# Patient Record
Sex: FEMALE | NOT HISPANIC OR LATINO | URBAN - METROPOLITAN AREA
[De-identification: names, ages, dates, MRNs, and addresses within clinical notes are randomized per-mention and may not be internally consistent; named-entity substitution may affect disease eponyms.]

---

## 2017-02-11 ENCOUNTER — IMPORTED ENCOUNTER (OUTPATIENT)
Dept: URBAN - METROPOLITAN AREA CLINIC 1 | Facility: CLINIC | Age: 37
End: 2017-02-11

## 2017-02-11 PROBLEM — H52.13: Noted: 2017-02-11

## 2017-02-11 PROCEDURE — S0620 ROUTINE OPHTHALMOLOGICAL EXA: HCPCS

## 2017-02-11 NOTE — PATIENT DISCUSSION
1. Myopia: Rx was given for correction if indicated and requested. 2. Dry Eyes OU 3. H/o LASIK OU  Recommend Daily CTLs Return for an appointment in 1 week for Contact lens check. with Dr. Bailey Spencer.

## 2018-05-14 ENCOUNTER — IMPORTED ENCOUNTER (OUTPATIENT)
Dept: URBAN - METROPOLITAN AREA CLINIC 1 | Facility: CLINIC | Age: 38
End: 2018-05-14

## 2018-05-14 PROBLEM — H52.13: Noted: 2018-05-14

## 2018-05-14 PROCEDURE — S0621 ROUTINE OPHTHALMOLOGICAL EXA: HCPCS

## 2018-05-14 NOTE — PATIENT DISCUSSION
1. Myopia: Rx was given for correction if indicated and requested. 2.  2.  Dry Eyes OU 3. H/o LASIK OU  4. Return for an appointment in 1 year for 39 and Contact lens check. with Dr. Grady Espana.

## 2018-09-21 ENCOUNTER — IMPORTED ENCOUNTER (OUTPATIENT)
Dept: URBAN - METROPOLITAN AREA CLINIC 1 | Facility: CLINIC | Age: 38
End: 2018-09-21

## 2018-09-21 PROBLEM — S05.02XA: Noted: 2018-09-21

## 2018-09-21 PROBLEM — H04.123: Noted: 2018-09-21

## 2018-09-21 PROCEDURE — 92012 INTRM OPH EXAM EST PATIENT: CPT

## 2018-09-21 NOTE — PATIENT DISCUSSION
1.  Corneal Abrasion OS (Active) : Resolving. No FB present on today's exam. Begin Tobramycin TID OS x 4 days then DC (erx). Begin PF ATs Q1-2H OU. No CTL wear for now. 2.  Dry Eyes OU -- Recommended to patient to use Artificial Tears3. Allergic Conjunctivitis OU - OTC Zaditor BID OU PRN for itchingReturn for an appointment in May 40/cc with Dr. Joana Murry.

## 2019-05-22 ENCOUNTER — IMPORTED ENCOUNTER (OUTPATIENT)
Dept: URBAN - METROPOLITAN AREA CLINIC 1 | Facility: CLINIC | Age: 39
End: 2019-05-22

## 2020-12-03 ENCOUNTER — IMPORTED ENCOUNTER (OUTPATIENT)
Dept: URBAN - METROPOLITAN AREA CLINIC 1 | Facility: CLINIC | Age: 40
End: 2020-12-03

## 2020-12-03 PROBLEM — H52.13: Noted: 2020-12-03

## 2020-12-03 PROCEDURE — S0621 ROUTINE OPHTHALMOLOGICAL EXA: HCPCS

## 2020-12-03 NOTE — PATIENT DISCUSSION
1. Myopia -- Rx was given for correction if indicated and requested. 2. Dry Eyes OU -- Cont the use of ATs BID OU routinely. 3.  Allergic Conjunctivitis OU -- Cont the use of Zaditor BID OU PRN Itching. Or the use of Pataday BID OU (Coupon Given). 4.  S/p LASIK Finalized CTL Rx and given to patient. Return for an appointment in 1 year 40/cc with Dr. Raegan Tipton.

## 2022-04-02 ASSESSMENT — TONOMETRY
OS_IOP_MMHG: 13
OD_IOP_MMHG: 14
OS_IOP_MMHG: 14
OD_IOP_MMHG: 14
OS_IOP_MMHG: 14
OD_IOP_MMHG: 13
OS_IOP_MMHG: 14
OD_IOP_MMHG: 14

## 2022-04-02 ASSESSMENT — VISUAL ACUITY
OD_SC: 20/20
OS_SC: 20/20
OS_SC: 20/20-1
OS_CC: J1+
OD_SC: 20/20
OD_SC: 20/20
OD_CC: J1+
OS_SC: 20/20-1
OD_SC: 20/20-1
OD_SC: 20/20
OS_SC: 20/30
OS_SC: 20/25

## 2022-04-02 ASSESSMENT — KERATOMETRY
OS_K1POWER_DIOPTERS: 40.75
OS_AXISANGLE2_DEGREES: 056
OD_AXISANGLE2_DEGREES: 111
OD_K2POWER_DIOPTERS: 41.00
OS_K2POWER_DIOPTERS: 41.25
OD_K1POWER_DIOPTERS: 40.50
OS_AXISANGLE_DEGREES: 146
OD_AXISANGLE_DEGREES: 021